# Patient Record
Sex: MALE | Race: WHITE | NOT HISPANIC OR LATINO | Employment: STUDENT | URBAN - METROPOLITAN AREA
[De-identification: names, ages, dates, MRNs, and addresses within clinical notes are randomized per-mention and may not be internally consistent; named-entity substitution may affect disease eponyms.]

---
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
## 2021-07-29 ENCOUNTER — HISTORICAL (OUTPATIENT)
Dept: ADMINISTRATIVE | Facility: HOSPITAL | Age: 21
End: 2021-07-29

## 2021-07-29 LAB
ABS NEUT (OLG): 4.2 X10(3)/MCL (ref 2.1–9.2)
ALBUMIN SERPL-MCNC: 4.8 GM/DL (ref 3.4–5)
ALBUMIN/GLOB SERPL: 1.85 {RATIO} (ref 1.5–2.5)
ALP SERPL-CCNC: 64 UNIT/L (ref 38–126)
ALT SERPL-CCNC: 19 UNIT/L (ref 7–52)
AST SERPL-CCNC: 25 UNIT/L (ref 15–37)
BILIRUB SERPL-MCNC: 0.4 MG/DL (ref 0.2–1)
BILIRUBIN DIRECT+TOT PNL SERPL-MCNC: 0.1 MG/DL (ref 0–0.5)
BILIRUBIN DIRECT+TOT PNL SERPL-MCNC: 0.3 MG/DL
BUN SERPL-MCNC: 24 MG/DL (ref 7–18)
CALCIUM SERPL-MCNC: 9.8 MG/DL (ref 8.5–10.1)
CHLORIDE SERPL-SCNC: 101 MMOL/L (ref 98–107)
CO2 SERPL-SCNC: 27 MMOL/L (ref 21–32)
CREAT SERPL-MCNC: 1.11 MG/DL (ref 0.6–1.3)
ERYTHROCYTE [DISTWIDTH] IN BLOOD BY AUTOMATED COUNT: 11.4 % (ref 11.5–17)
GLOBULIN SER-MCNC: 2.7 GM/DL (ref 1.2–3)
GLUCOSE SERPL-MCNC: 82 MG/DL (ref 74–106)
HCT VFR BLD AUTO: 39.7 % (ref 42–52)
HGB BLD-MCNC: 13.5 GM/DL (ref 14–18)
LYMPHOCYTES # BLD AUTO: 2 X10(3)/MCL (ref 0.6–3.4)
LYMPHOCYTES NFR BLD AUTO: 29.2 % (ref 13–40)
MCH RBC QN AUTO: 27.8 PG (ref 27–31.2)
MCHC RBC AUTO-ENTMCNC: 34 GM/DL (ref 32–36)
MCV RBC AUTO: 82 FL (ref 80–94)
MONOCYTES # BLD AUTO: 0.8 X10(3)/MCL (ref 0.1–1.3)
MONOCYTES NFR BLD AUTO: 11.1 % (ref 0.1–24)
NEUTROPHILS NFR BLD AUTO: 59.7 % (ref 47–80)
PLATELET # BLD AUTO: 258 X10(3)/MCL (ref 130–400)
PMV BLD AUTO: 8.7 FL (ref 9.4–12.4)
POTASSIUM SERPL-SCNC: 4.8 MMOL/L (ref 3.5–5.1)
PROT SERPL-MCNC: 7.4 GM/DL (ref 6.4–8.2)
RBC # BLD AUTO: 4.85 X10(6)/MCL (ref 4.7–6.1)
SODIUM SERPL-SCNC: 137 MMOL/L (ref 136–145)
WBC # SPEC AUTO: 7 X10(3)/MCL (ref 4.5–11.5)

## 2021-12-29 ENCOUNTER — HISTORICAL (OUTPATIENT)
Dept: ADMINISTRATIVE | Facility: HOSPITAL | Age: 21
End: 2021-12-29

## 2021-12-29 LAB — SARS-COV-2 RNA RESP QL NAA+PROBE: NOT DETECTED

## 2022-04-11 ENCOUNTER — HISTORICAL (OUTPATIENT)
Dept: ADMINISTRATIVE | Facility: HOSPITAL | Age: 22
End: 2022-04-11

## 2022-04-29 VITALS
WEIGHT: 178.81 LBS | HEIGHT: 66 IN | DIASTOLIC BLOOD PRESSURE: 52 MMHG | OXYGEN SATURATION: 98 % | SYSTOLIC BLOOD PRESSURE: 114 MMHG | BODY MASS INDEX: 28.74 KG/M2

## 2022-08-04 PROBLEM — Z23 IMMUNIZATION DUE: Status: ACTIVE | Noted: 2022-08-04

## 2022-08-04 PROBLEM — D64.9 ANEMIA: Status: ACTIVE | Noted: 2022-08-04

## 2022-08-04 PROBLEM — Z00.00 ENCOUNTER FOR WELLNESS EXAMINATION IN ADULT: Status: ACTIVE | Noted: 2022-08-04

## 2022-09-22 ENCOUNTER — HISTORICAL (OUTPATIENT)
Dept: ADMINISTRATIVE | Facility: HOSPITAL | Age: 22
End: 2022-09-22

## 2022-11-07 PROBLEM — Z00.00 ENCOUNTER FOR WELLNESS EXAMINATION IN ADULT: Status: RESOLVED | Noted: 2022-08-04 | Resolved: 2022-11-07

## 2023-08-05 PROBLEM — D64.9 ANEMIA: Status: RESOLVED | Noted: 2022-08-04 | Resolved: 2023-08-05

## 2023-08-05 PROBLEM — E78.1 HYPERTRIGLYCERIDEMIA: Status: ACTIVE | Noted: 2023-08-05

## 2023-08-08 PROCEDURE — 87389 HIV-1 AG W/HIV-1&-2 AB AG IA: CPT | Performed by: FAMILY MEDICINE

## 2023-08-08 PROCEDURE — 86803 HEPATITIS C AB TEST: CPT | Performed by: FAMILY MEDICINE

## 2023-11-06 PROBLEM — Z00.00 ENCOUNTER FOR WELLNESS EXAMINATION IN ADULT: Status: RESOLVED | Noted: 2022-08-04 | Resolved: 2023-11-06

## 2024-10-10 ENCOUNTER — ATHLETIC TRAINING SESSION (OUTPATIENT)
Dept: SPORTS MEDICINE | Facility: CLINIC | Age: 24
End: 2024-10-10

## 2024-10-10 DIAGNOSIS — M79.601 MUSCULOSKELETAL ARM PAIN, RIGHT: ICD-10-CM

## 2024-10-10 DIAGNOSIS — Z00.00 HEALTHCARE MAINTENANCE: Primary | ICD-10-CM

## 2024-10-10 NOTE — PROGRESS NOTES
Reason for Encounter N/A    Subjective:       Chief Complaint: Damián Bhardwaj is a 24 y.o. male student at  who had concerns including Health Maintenance of the Right Forearm.    Handedness: right-handed  Sport played: baseball      Level: college      Position:second base            Objective:     AT Session    Assessment:     Status: F - Full Participation    Date Seen:  10/09/2024    Date of Injury:  N/a    Date Out:  N/a    Date Cleared:  N/a        Treatment/Rehab/Maintenance:     DATE OF SERVICE: 10/9/2024  MAINTENANCE LOG  INJURY/CONDITON: R Forearm tightness    Damián received the following modalities and/or treatments:  Instrument-Assisted Soft Tissue Mobilization (IASTM) over R forearm.    OTHER/COMMENTS:   describes R forearm tightness with throws today. Only got treatment because someone else was doing it.      Plan:       1.  will continue maintenance treatments with ATC PRN.  2. Physician Referral: no  3. ED Referral:no  4. Parent/Guardian Notified: No  5. All questions were answered, ath. will contact me for questions or concerns in the interim.  6.         Eligible to use School Insurance: Yes

## 2025-03-05 ENCOUNTER — ATHLETIC TRAINING SESSION (OUTPATIENT)
Dept: SPORTS MEDICINE | Facility: CLINIC | Age: 25
End: 2025-03-05

## 2025-03-05 DIAGNOSIS — M79.651 RIGHT THIGH PAIN: Primary | ICD-10-CM

## 2025-03-05 NOTE — PROGRESS NOTES
Reason for Encounter New Injury    Subjective:       Chief Complaint: Damián Bhardwaj is a 24 y.o. male student at [employer] who had concerns including Pain of the Right Thigh.     has cc of R groin pain.  states he first felt a pull in-game on 2/22, but his pain was minimal and short-lived allowing him to finish the game. During a game on 2/25, the SA slid into 3rd base and got up slowly due to some residual soreness/pain in the same location. He does not feel it at all while running or making lateral movements. ATC cannot reproduce pain, and  states he feels fine.     Handedness: right-handed  Sport played: baseball      Level: college      Position:outfield        Objective:     AT Session      Assessment:     Status: F - Full Participation    Date Seen:  02/26/2025-02/27/2025    Date of Injury:  02/22/2025    Date Out:  N/a    Date Cleared:  N/a        Treatment/Rehab/Maintenance:       DATE OF SERVICE: 2/27/25  Damián completed therapeutic exercises to develop strength:     Exercise Reps/Sets/Time Weight #   Adductor squeeze bridges 3x10    [place] 3x10    Adductor dips 3x10 10# KB   Lateral lunge shifts 2x10 both ways 10# KB   90/90 hip rotations 20x    IR with block 3x10    Box shuffles 3x10       reports pain rated 2.5/10 and feels 95% of 100%.              DATE OF SERVICE: 2/26/25  Damián completed therapeutic exercises to develop strength:     Exercise Reps/Sets/Time Weight #   Adductor squeeze bridges 3x10    [place] 3x10    Adductor dips 3x10 10# KB   Lateral lunge shifts 2x10 both ways 10# KB   90/90 hip rotations 20x    Plate slides 2x10 17#      has no pain with any of the above exercises.      Plan:       1.  will continue maintenance treatments with ATC PRN.   2. Physician Referral: no  3. ED Referral:no  4. Parent/Guardian Notified: No  5. All questions were answered, ath. will contact me for questions or concerns in the interim.  6.         Eligible to use School Insurance: Yes